# Patient Record
(demographics unavailable — no encounter records)

---

## 2025-04-01 NOTE — HISTORY OF PRESENT ILLNESS
[de-identified] : 04/01/2025 : TAMEKA HEREDIA  is a 25 year  old male who presents to the office for evaluation of his bilateral knees.  He states that he has had left knee pain for over a year that is located over the anterior aspect the knee and worse with certain motions and activities and alleviated with rest.  He states he does [employer] and felt that his knee popped a few times a year or so ago and that when the pain started.  He states he never had any bad swelling or bad pain after but did have some discomfort after those injuries.  He states he never had any dislocation and has tried doing some gentle therapy exercises and strengthening exercise at the gym on his own but still has pain and discomfort.  He states recently he was doing a move during [employer] and felt a stretching pain sensation on the anterior medial aspect of the right knee that is new.  He wanted to have both of these knees looked at because of the nature of his sport and continued pain.  He denies any numbness or tingling distally.  He has no other complaints today.

## 2025-04-01 NOTE — DISCUSSION/SUMMARY
[de-identified] : Assessment: 25-year-old male with bilateral knee patellofemoral syndrome left slightly worse than right  Plan: I had a long discussion with the patient today regarding the nature of their diagnosis and treatment plan. We discussed the risks and benefits of no treatment as well as nonoperative and operative treatments.  I reviewed the x-rays today that showed no acute bony pathology.  On examination today he has good range of motion and strength without any mechanical sensations.  He does have slight laxity to Lachman testing on the left side when compared to the right but does maintain an endpoint and subjectively the patient does not complain of any sensations of instability or buckling.  At this time I am recommending conservative treatment including ice, heat, rest, over-the-counter anti-inflammatories, physical therapy for symptomatic relief.  GI precautions were discussed and a prescription for physical therapy was provided today.  He will avoid exacerbate activities and will follow-up in 6 to 8 weeks for repeat evaluation as needed.  If symptoms were to persist we would likely consider MRI at next office visit.   The patient verbalizes their understanding and agrees with the plan.  All questions were answered to their satisfaction.   I, Dr. Jerome, personally performed the evaluation and management (E/M) services for this new patient.  That E/M includes conducting the clinically appropriate initial history &/or exam, assessing all conditions, and establishing the plan of care.  Today, my ADRIAN, was here to observe my evaluation and management service for this patient & follow plan of care established by me going forward.

## 2025-04-01 NOTE — PHYSICAL EXAM
[de-identified] : General: Awake, alert, no acute distress, Patient was cooperative and appropriate during the examination.  The patient is of normal weight for height and age.  Walks without an antalgic gait.  Bilateral knee Examination: Physical examination of the knee demonstrates normal skin without signs of skin changes or abnormalities. No erythema, warmth, or joint effusion is appreciated .   Sensation is intact to light touch L2-S1 Palpable DP/PT pulse EHL/FHL/TA/GSC motor function intact   Range of Motion 0-130 degrees   Strength Testing Quadriceps/Hamstring 5/5 Patient is able to perform a straight leg raise without difficulty.   Palpation Not tender to palpation about the distal femur, proximal tibia, or patella No palpable defect appreciated in the quadriceps or patellar tendons Not tender to palpation of medial joint line Not tender to palpation of lateral joint line Mildly tender in the patellofemoral compartment on the right, moderately on the left   Special Tests Anterior Drawer negative Posterior Drawer negative Lachman Exam grade 2A on the left, grade 1 on the right No Varus or Valgus Laxity at 0 or 30 degrees of knee flexion Florencio's Test negative for pain or crepitus Active compression of the patella negative for pain or crepitus Translation of the patella 2 quadrants with a firm endpoint [de-identified] : X-rays 3 views of the bilateral knees taken in the office on 4/1/2025 shows no acute fracture or dislocation.  No arthritis